# Patient Record
Sex: FEMALE | Race: WHITE
[De-identification: names, ages, dates, MRNs, and addresses within clinical notes are randomized per-mention and may not be internally consistent; named-entity substitution may affect disease eponyms.]

---

## 2017-02-18 ENCOUNTER — HOSPITAL ENCOUNTER (EMERGENCY)
Dept: HOSPITAL 84 - D.ER | Age: 57
Discharge: HOME | End: 2017-02-18
Payer: MEDICAID

## 2017-02-18 VITALS — BODY MASS INDEX: 20.8 KG/M2

## 2017-02-18 DIAGNOSIS — S00.01XA: Primary | ICD-10-CM

## 2017-02-18 DIAGNOSIS — C34.90: ICD-10-CM
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
## 2017-02-18 DIAGNOSIS — F17.200: ICD-10-CM
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
## 2017-08-04 ENCOUNTER — HOSPITAL ENCOUNTER (EMERGENCY)
Dept: HOSPITAL 84 - D.ER | Age: 57
Discharge: HOME | End: 2017-08-04
Payer: MEDICAID

## 2017-08-04 VITALS — BODY MASS INDEX: 20.8 KG/M2

## 2017-08-04 DIAGNOSIS — C34.90: ICD-10-CM

## 2017-08-04 DIAGNOSIS — Z76.0: ICD-10-CM

## 2017-08-04 DIAGNOSIS — J44.9: ICD-10-CM

## 2017-08-04 DIAGNOSIS — E16.2: Primary | ICD-10-CM

## 2017-08-04 DIAGNOSIS — J45.909: ICD-10-CM

## 2017-08-04 LAB
ALBUMIN SERPL-MCNC: 3.7 G/DL (ref 3.4–5)
ALP SERPL-CCNC: 77 U/L (ref 46–116)
ALT SERPL-CCNC: 77 U/L (ref 10–68)
ANION GAP SERPL CALC-SCNC: 16.3 MMOL/L (ref 8–16)
APPEARANCE UR: CLEAR
BASOPHILS NFR BLD AUTO: 0.4 % (ref 0–2)
BILIRUB SERPL-MCNC: 0.63 MG/DL (ref 0.2–1.3)
BILIRUB SERPL-MCNC: NEGATIVE MG/DL
BUN SERPL-MCNC: 9 MG/DL (ref 7–18)
CALCIUM SERPL-MCNC: 8.4 MG/DL (ref 8.5–10.1)
CHLORIDE SERPL-SCNC: 102 MMOL/L (ref 98–107)
CK MB SERPL-MCNC: 0.7 U/L (ref 0–3.6)
CK SERPL-CCNC: 104 UL (ref 21–215)
CO2 SERPL-SCNC: 23.5 MMOL/L (ref 21–32)
COLOR UR: YELLOW
CREAT SERPL-MCNC: 0.6 MG/DL (ref 0.6–1.3)
EOSINOPHIL NFR BLD: 6.7 % (ref 0–7)
ERYTHROCYTE [DISTWIDTH] IN BLOOD BY AUTOMATED COUNT: 13.4 % (ref 11.5–14.5)
GLOBULIN SER-MCNC: 3.1 G/L
GLUCOSE SERPL-MCNC: 135 MG/DL (ref 74–106)
GLUCOSE SERPL-MCNC: 50 MG/DL
HCT VFR BLD CALC: 39.4 % (ref 36–48)
HGB BLD-MCNC: 13.6 G/DL (ref 12–16)
IMM GRANULOCYTES NFR BLD: 0 % (ref 0–5)
KETONES UR STRIP-MCNC: (no result) MG/DL
LEUKOCYTE ESTERASE: NEGATIVE
LYMPHOCYTES NFR BLD AUTO: 30.1 % (ref 15–50)
MCH RBC QN AUTO: 33.7 PG (ref 26–34)
MCHC RBC AUTO-ENTMCNC: 34.5 G/DL (ref 31–37)
MCV RBC: 97.5 FL (ref 80–100)
MONOCYTES NFR BLD: 6.8 % (ref 2–11)
NEUTROPHILS NFR BLD AUTO: 56 % (ref 40–80)
NITRITE UR-MCNC: NEGATIVE MG/ML
OSMOLALITY SERPL CALC.SUM OF ELEC: 276 MOSM/KG (ref 275–300)
PH UR STRIP: 5 [PH] (ref 5–6)
PLATELET # BLD: 175 10X3/UL (ref 130–400)
PMV BLD AUTO: 9.4 FL (ref 7.4–10.4)
POTASSIUM SERPL-SCNC: 3.8 MMOL/L (ref 3.5–5.1)
PROT SERPL-MCNC: 6.8 G/DL (ref 6.4–8.2)
PROT UR-MCNC: NEGATIVE MG/DL
RBC # BLD AUTO: 4.04 10X6/UL (ref 4–5.4)
SODIUM SERPL-SCNC: 138 MMOL/L (ref 136–145)
SP GR UR STRIP: 1.01 (ref 1–1.02)
TROPONIN I SERPL-MCNC: < 0.017 NG/ML (ref 0–0.06)
UROBILINOGEN UR-MCNC: NORMAL MG/DL
WBC # BLD AUTO: 5.4 10X3/UL (ref 4.8–10.8)

## 2017-12-12 ENCOUNTER — HOSPITAL ENCOUNTER (EMERGENCY)
Dept: HOSPITAL 84 - D.ER | Age: 57
LOS: 1 days | Discharge: HOME | End: 2017-12-13
Payer: MEDICAID

## 2017-12-12 VITALS — BODY MASS INDEX: 20.8 KG/M2

## 2017-12-12 DIAGNOSIS — F17.200: ICD-10-CM

## 2017-12-12 DIAGNOSIS — R51: Primary | ICD-10-CM

## 2019-06-01 ENCOUNTER — HOSPITAL ENCOUNTER (OUTPATIENT)
Dept: HOSPITAL 84 - D.ER | Age: 59
Setting detail: OBSERVATION
LOS: 1 days | Discharge: LEFT BEFORE DISCHARGE | End: 2019-06-02
Admitting: FAMILY MEDICINE
Payer: MEDICAID
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
## 2019-06-01 DIAGNOSIS — R40.2244: ICD-10-CM

## 2019-06-01 DIAGNOSIS — F41.9: ICD-10-CM

## 2019-06-01 DIAGNOSIS — F10.129: Primary | ICD-10-CM

## 2019-06-01 DIAGNOSIS — I10: ICD-10-CM

## 2019-06-01 DIAGNOSIS — R40.2354: ICD-10-CM

## 2019-06-01 DIAGNOSIS — J44.9: ICD-10-CM

## 2019-06-01 DIAGNOSIS — R40.2144: ICD-10-CM

## 2019-06-01 DIAGNOSIS — M79.7: ICD-10-CM

## 2019-06-01 DIAGNOSIS — F32.9: ICD-10-CM

## 2021-05-04 ENCOUNTER — HOSPITAL ENCOUNTER (INPATIENT)
Dept: HOSPITAL 84 - D.ER | Age: 61
LOS: 3 days | Discharge: HOME | DRG: 439 | End: 2021-05-07
Attending: FAMILY MEDICINE | Admitting: FAMILY MEDICINE
Payer: MEDICAID

## 2021-05-04 VITALS — SYSTOLIC BLOOD PRESSURE: 160 MMHG | DIASTOLIC BLOOD PRESSURE: 99 MMHG

## 2021-05-04 VITALS — SYSTOLIC BLOOD PRESSURE: 127 MMHG | DIASTOLIC BLOOD PRESSURE: 76 MMHG

## 2021-05-04 VITALS — SYSTOLIC BLOOD PRESSURE: 158 MMHG | DIASTOLIC BLOOD PRESSURE: 93 MMHG

## 2021-05-04 VITALS — DIASTOLIC BLOOD PRESSURE: 82 MMHG | SYSTOLIC BLOOD PRESSURE: 149 MMHG

## 2021-05-04 VITALS — DIASTOLIC BLOOD PRESSURE: 95 MMHG | SYSTOLIC BLOOD PRESSURE: 157 MMHG

## 2021-05-04 VITALS — SYSTOLIC BLOOD PRESSURE: 151 MMHG | DIASTOLIC BLOOD PRESSURE: 90 MMHG

## 2021-05-04 VITALS — SYSTOLIC BLOOD PRESSURE: 161 MMHG | DIASTOLIC BLOOD PRESSURE: 95 MMHG

## 2021-05-04 VITALS — DIASTOLIC BLOOD PRESSURE: 86 MMHG | SYSTOLIC BLOOD PRESSURE: 135 MMHG

## 2021-05-04 VITALS — DIASTOLIC BLOOD PRESSURE: 98 MMHG | SYSTOLIC BLOOD PRESSURE: 163 MMHG

## 2021-05-04 VITALS
BODY MASS INDEX: 21.34 KG/M2 | WEIGHT: 125 LBS | WEIGHT: 125 LBS | BODY MASS INDEX: 21.34 KG/M2 | HEIGHT: 64 IN | BODY MASS INDEX: 21.34 KG/M2 | HEIGHT: 64 IN

## 2021-05-04 VITALS — SYSTOLIC BLOOD PRESSURE: 123 MMHG | DIASTOLIC BLOOD PRESSURE: 83 MMHG

## 2021-05-04 DIAGNOSIS — J44.9: ICD-10-CM

## 2021-05-04 DIAGNOSIS — N39.0: ICD-10-CM

## 2021-05-04 DIAGNOSIS — R74.01: ICD-10-CM

## 2021-05-04 DIAGNOSIS — R00.0: ICD-10-CM

## 2021-05-04 DIAGNOSIS — F17.203: ICD-10-CM

## 2021-05-04 DIAGNOSIS — E87.1: ICD-10-CM

## 2021-05-04 DIAGNOSIS — I25.10: ICD-10-CM

## 2021-05-04 DIAGNOSIS — K85.90: Primary | ICD-10-CM

## 2021-05-04 DIAGNOSIS — E87.6: ICD-10-CM

## 2021-05-04 DIAGNOSIS — I10: ICD-10-CM

## 2021-05-04 DIAGNOSIS — F10.20: ICD-10-CM

## 2021-05-04 DIAGNOSIS — E87.3: ICD-10-CM

## 2021-05-04 LAB
ALBUMIN SERPL-MCNC: 3.4 G/DL (ref 3.4–5)
ALP SERPL-CCNC: 166 U/L (ref 30–120)
ALT SERPL-CCNC: 99 U/L (ref 10–68)
AMPHETAMINES UR QL SCN: NEGATIVE QUAL
ANION GAP SERPL CALC-SCNC: 25.9 MMOL/L (ref 8–16)
ANION GAP SERPL CALC-SCNC: 30.6 MMOL/L (ref 8–16)
BACTERIA #/AREA URNS HPF: (no result) HPF
BARBITURATES UR QL SCN: NEGATIVE QUAL
BASOPHILS NFR BLD AUTO: 0.2 % (ref 0–2)
BENZODIAZ UR QL SCN: NEGATIVE QUAL
BILIRUB SERPL-MCNC: 1.17 MG/DL (ref 0.2–1.3)
BILIRUB SERPL-MCNC: NEGATIVE MG/DL
BUN SERPL-MCNC: 7 MG/DL (ref 7–18)
BUN SERPL-MCNC: 7 MG/DL (ref 7–18)
BZE UR QL SCN: NEGATIVE QUAL
CALCIUM SERPL-MCNC: 7.5 MG/DL (ref 8.5–10.1)
CALCIUM SERPL-MCNC: 8.9 MG/DL (ref 8.5–10.1)
CANNABINOIDS UR QL SCN: NEGATIVE QUAL
CHLORIDE SERPL-SCNC: 100 MMOL/L (ref 98–107)
CHLORIDE SERPL-SCNC: 93 MMOL/L (ref 98–107)
CHOLEST/HDLC SERPL: 2.5 RATIO (ref 2.3–4.1)
CK MB SERPL-MCNC: 1.4 U/L (ref 0–3.6)
CK MB SERPL-MCNC: 1.6 U/L (ref 0–3.6)
CK MB SERPL-MCNC: 1.7 U/L (ref 0–3.6)
CK SERPL-CCNC: 38 UL (ref 21–215)
CK SERPL-CCNC: 39 UL (ref 21–215)
CK SERPL-CCNC: 57 UL (ref 21–215)
CO2 SERPL-SCNC: 14.2 MMOL/L (ref 21–32)
CO2 SERPL-SCNC: 15.7 MMOL/L (ref 21–32)
CREAT SERPL-MCNC: 0.8 MG/DL (ref 0.6–1.3)
CREAT SERPL-MCNC: 1.1 MG/DL (ref 0.6–1.3)
EOSINOPHIL NFR BLD: 0 % (ref 0–7)
ERYTHROCYTE [DISTWIDTH] IN BLOOD BY AUTOMATED COUNT: 14.1 % (ref 11.5–14.5)
GLOBULIN SER-MCNC: 3.5 G/L
GLUCOSE SERPL-MCNC: 102 MG/DL (ref 74–106)
GLUCOSE SERPL-MCNC: 116 MG/DL (ref 74–106)
HCT VFR BLD CALC: 37.2 % (ref 36–48)
HDLC SERPL-MCNC: 63 MG/DL (ref 32–96)
HGB BLD-MCNC: 12.9 G/DL (ref 12–16)
IMM GRANULOCYTES NFR BLD: 0.3 % (ref 0–5)
KETONES UR STRIP-MCNC: (no result) MG/DL
LDL-HDL RATIO: 1.1 RATIO (ref 1.5–3.5)
LDLC SERPL-MCNC: 70 MG/DL (ref 0–100)
LIPASE SERPL-CCNC: 4823 U/L (ref 73–393)
LYMPHOCYTES # BLD: 0.92 10X3/UL (ref 1.18–3.74)
LYMPHOCYTES NFR BLD AUTO: 9.9 % (ref 15–50)
MAGNESIUM SERPL-MCNC: 1.3 MG/DL (ref 1.8–2.4)
MCH RBC QN AUTO: 35.4 PG (ref 26–34)
MCHC RBC AUTO-ENTMCNC: 34.7 G/DL (ref 31–37)
MCV RBC: 102.2 FL (ref 80–100)
MONOCYTES NFR BLD: 8.3 % (ref 2–11)
NEUTROPHILS # BLD: 7.55 10X3/UL (ref 1.56–6.13)
NEUTROPHILS NFR BLD AUTO: 81.3 % (ref 40–80)
NITRITE UR-MCNC: NEGATIVE MG/ML
OPIATES UR QL SCN: NEGATIVE QUAL
OSMOLALITY SERPL CALC.SUM OF ELEC: 266 MOSM/KG (ref 275–300)
OSMOLALITY SERPL CALC.SUM OF ELEC: 273 MOSM/KG (ref 275–300)
PCP UR QL SCN: NEGATIVE QUAL
PH UR STRIP: 5 [PH] (ref 5–8)
PLATELET # BLD: 207 10X3/UL (ref 130–400)
PMV BLD AUTO: 9.7 FL (ref 7.4–10.4)
POTASSIUM SERPL-SCNC: 3.6 MMOL/L (ref 3.5–5.1)
POTASSIUM SERPL-SCNC: 3.8 MMOL/L (ref 3.5–5.1)
PROT SERPL-MCNC: 6.9 G/DL (ref 6.4–8.2)
RBC # BLD AUTO: 3.64 10X6/UL (ref 4–5.4)
SODIUM SERPL-SCNC: 134 MMOL/L (ref 136–145)
SODIUM SERPL-SCNC: 138 MMOL/L (ref 136–145)
SQUAMOUS #/AREA URNS HPF: (no result) HPF (ref 0–4)
TRIGL SERPL-MCNC: 112 MG/DL (ref 30–200)
TROPONIN I SERPL-MCNC: 0.14 NG/ML (ref 0–0.06)
TROPONIN I SERPL-MCNC: 0.17 NG/ML (ref 0–0.06)
TROPONIN I SERPL-MCNC: 0.22 NG/ML (ref 0–0.06)
UROBILINOGEN UR-MCNC: NORMAL MG/DL (ref ?–2)
WBC # BLD AUTO: 9.3 10X3/UL (ref 4.8–10.8)
WBC #/AREA URNS HPF: (no result) HPF (ref 0–4)

## 2021-05-04 NOTE — NUR
CRITICAL RESULT CALLED TO ED. LACTIC ACID 2.6. EDP ETIENNE NOTIFIED, FURTHER
ORDERS ENTERED INTO CHART.

## 2021-05-04 NOTE — NUR
CODE SEPSIS CALLED TO ED ROOM 10. RAPID RESPONSE TREATMENT RECORD COMPLETED AND
ABX INITIATED AND DOCUMENTED IN MAR.

## 2021-05-04 NOTE — NUR
PATIENT TO UNTI VIA WHEELCHAIR. UP WITH STANDBY ASSIST FROM WHEELCHAIR TO BED.
IV IN LEFT AC SALINE LOCKED, IV IN RIGHT AC SALINE LOCKED. COMPLAINS OF
ABDOMINAL PAIN. DOCTOR AWARE. BED LOW POSITION, CALL LIGHT IN REACH. WILL
CONTINUE TO MONITOR.

## 2021-05-04 NOTE — NUR
ASSESSMENT PER FLOW SHEET, VS OBTAINED PER CNA, SALINE LOCK IN LEFT AND RIGHT
AC INTACT WITH NO REDNESS OR EDEMA, PT DENIES FLATUS, NO BM AND VOIDING WITH
SLIGHT BURNING SENSATION, PT INST ON AND ENC TO USE I.S., PT REFUSES SCD'S,
DENIES NEEDS OR PAIN AT THIS TIME, BED IN LOW POSITION, SIDE RAILS RAILS X 2,
CALL LIGHT IN REACH

## 2021-05-04 NOTE — NUR
PT AWAKE, WATCHING TV, FLUSHED SALINE LOCK, ADM 2200 MED SIVP PER MD ORDERS,
SEE EMAR, SALINE LOCK FLUSHED, PT REQUESTED AND SERVED ICE CHIPS, DENIES
FURTHER NEEDS OR PAIN

## 2021-05-05 VITALS — DIASTOLIC BLOOD PRESSURE: 74 MMHG | SYSTOLIC BLOOD PRESSURE: 132 MMHG

## 2021-05-05 VITALS — SYSTOLIC BLOOD PRESSURE: 125 MMHG | DIASTOLIC BLOOD PRESSURE: 92 MMHG

## 2021-05-05 VITALS — SYSTOLIC BLOOD PRESSURE: 132 MMHG | DIASTOLIC BLOOD PRESSURE: 82 MMHG

## 2021-05-05 VITALS — SYSTOLIC BLOOD PRESSURE: 144 MMHG | DIASTOLIC BLOOD PRESSURE: 87 MMHG

## 2021-05-05 VITALS — DIASTOLIC BLOOD PRESSURE: 95 MMHG | SYSTOLIC BLOOD PRESSURE: 140 MMHG

## 2021-05-05 LAB
ALBUMIN SERPL-MCNC: 2.6 G/DL (ref 3.4–5)
ALP SERPL-CCNC: 119 U/L (ref 30–120)
ALT SERPL-CCNC: 67 U/L (ref 10–68)
AMYLASE SERPL-CCNC: 124 U/L (ref 25–115)
ANION GAP SERPL CALC-SCNC: 18.5 MMOL/L (ref 8–16)
BASOPHILS NFR BLD AUTO: 0.1 % (ref 0–2)
BILIRUB SERPL-MCNC: 0.84 MG/DL (ref 0.2–1.3)
BUN SERPL-MCNC: 6 MG/DL (ref 7–18)
CALCIUM SERPL-MCNC: 7.8 MG/DL (ref 8.5–10.1)
CHLORIDE SERPL-SCNC: 99 MMOL/L (ref 98–107)
CO2 SERPL-SCNC: 21.5 MMOL/L (ref 21–32)
CREAT SERPL-MCNC: 0.5 MG/DL (ref 0.6–1.3)
EOSINOPHIL NFR BLD: 1 % (ref 0–7)
ERYTHROCYTE [DISTWIDTH] IN BLOOD BY AUTOMATED COUNT: 14.2 % (ref 11.5–14.5)
GLOBULIN SER-MCNC: 3 G/L
GLUCOSE SERPL-MCNC: 72 MG/DL (ref 74–106)
HCT VFR BLD CALC: 30.2 % (ref 36–48)
HCV AB S/CO SERPL IA: <0.1 S/CO RAT (ref 0–0.9)
HGB BLD-MCNC: 10 G/DL (ref 12–16)
IMM GRANULOCYTES NFR BLD: 0.1 % (ref 0–5)
LIPASE SERPL-CCNC: 907 U/L (ref 73–393)
LYMPHOCYTES # BLD: 1.25 10X3/UL (ref 1.18–3.74)
LYMPHOCYTES NFR BLD AUTO: 18.6 % (ref 15–50)
MAGNESIUM SERPL-MCNC: 1.9 MG/DL (ref 1.8–2.4)
MCH RBC QN AUTO: 34.2 PG (ref 26–34)
MCHC RBC AUTO-ENTMCNC: 33.1 G/DL (ref 31–37)
MCV RBC: 103.4 FL (ref 80–100)
MONOCYTES NFR BLD: 10.6 % (ref 2–11)
NEUTROPHILS # BLD: 4.66 10X3/UL (ref 1.56–6.13)
NEUTROPHILS NFR BLD AUTO: 69.6 % (ref 40–80)
OSMOLALITY SERPL CALC.SUM OF ELEC: 268 MOSM/KG (ref 275–300)
PLATELET # BLD: 162 10X3/UL (ref 130–400)
PMV BLD AUTO: 9.5 FL (ref 7.4–10.4)
POTASSIUM SERPL-SCNC: 3 MMOL/L (ref 3.5–5.1)
PROT SERPL-MCNC: 5.6 G/DL (ref 6.4–8.2)
RBC # BLD AUTO: 2.92 10X6/UL (ref 4–5.4)
SODIUM SERPL-SCNC: 136 MMOL/L (ref 136–145)
WBC # BLD AUTO: 6.7 10X3/UL (ref 4.8–10.8)

## 2021-05-05 NOTE — EC
PATIENT:ROSI JEFFRIES       DATE OF SERVICE: 05/04/21
SEX: F                                  MEDICAL RECORD: [id number]
DATE OF BIRTH: [birth year]                        LOCATION:[facility]
AGE OF PATIENT: 60                             ADMISSION DATE: 05/04/21
 
REFERRING PHYSICIAN:                               
 
INTERPRETING PHYSICIAN: GREGORY CANTRELL MD          
 
 
 
                             ECHOCARDIOGRAM REPORT
  ECHO CHARGES 4               ECHO COMPLETE                 Date: 05/04/21
 
 
 
CLINICAL DIAGNOSIS: LVH                           
 
                         ECHOCARDIOGRAPHIC MEASUREMENTS
      (adult normal given)
   AC root (d.<3.7cm) 2.4  cm   LV Septum d (<1.2 cm> 0.8  cm
      Valve Excursion 1.2  cm     LV Septum (systole) 1.2  cm
Left Atria (s.<4.0cm> 3.9  cm          LVPW d(<1.2cm) 1.1  cm
        RV (d.<2.3cm) 2.3  cm           LVPW (sytole) 1.3  cm
  LV diastole(<5.6CM) 4.8  cm       MV E-F(>70mm/sec)      cm
           LV systole 3.8  cm           LVOT Diameter 1.7  cm
       MV exc.(>10mm) 1.5  cm
Est.ejection fraction (50-75%)     %
 
   DOPPLER:
     LVIT      cm/sec A 92   cm/sec E 63    cm/sec
       LA      cm/sec      RVSP 30   mmHg
     LVOT 121  cm/sec   AOP1/2T      m/s
  Asc. Ao 156  cm/sec
     RVOT 63   cm/sec
       RA      cm/sec
       PA 83   cm/sec
 AV Gradient Peak 9.7  mmHg  AV Mean 5.8  mmHg  AV Area 1.7  cm
 MV Gradient Peak 4.8  mmHg  MV Mean 2.1  mmHg  MV Area      cm
   COMMENTS:                                              
 
 
 Cardiac Sonographer: Branden HEALY             
      Cardiologist: 3          Dr. Duenas             
             TAPE#                
                                       Pericardial Effusion N                        
 
 
DATE OF SERVICE:  
 
Adequate 2D, color flow imaging, spectral Doppler, and M-Mode.
 
No LVH.  LV internal dimensions are normal.  Wall motion is normal.  EF is
greater than or equal to 55%.  Aortic valve is tricuspid.  No evidence of
stenosis by Doppler interrogation.  There is mild AI by color flow imaging. 
Left atrium is normal at 3.9 cm.  Mitral valve shows no prolapse.  Trace MR. 
Right side is grossly normal.  Trace TR.
 
 
 
 
ECHOCARDIOGRAM REPORT                          [id number]    ROSI JEFFRIES
 
 
TRANSINT:[id number] Voice Confirmation ID: [id number] DOCUMENT ID: [id number]
                                           
                                           GREGORY CANTRELL MD          
 
 
 
Electronically Signed by GREGORY CANTRELL on 05/05/21 at 1338
 
 
 
 
 
 
 
 
 
 
 
 
 
 
 
 
 
 
 
 
 
 
 
 
 
 
 
 
 
 
 
 
 
 
 
 
 
 
 
CC:                                                             [id number]
DICTATION DATE: 05/04/21 1524     :     05/04/21 1601      ADM IN  
                                                                              
[facility]                                          
[address] [id number]

## 2021-05-05 NOTE — NUR
RESTING IN BED WITH EYES OPEN, ALERT AND ORIENTED. IV LOCATED TO LEFT AC
CURRENTLY RUNNING @ 125, SECOND IV TO RIGHT AC CURRENTLY SL. REFUSED TO WEAR
SCDS. ASSISTED TO BATHROOM. NO CURRENT S/S OF DISTRESS, DENIES FURTHER NEEDS,
WILL CONT TO MONITOR.

## 2021-05-05 NOTE — NUR
PT AWAKE, SALINE LOCKS FLUSHED, NS HUNG AT KVO, ROCEPHIN HUNG IVPB, BANANA BAG
HUNG, ADM PROTONIX WITH A SMALL SIP OF H20, LOPRESSOR ADM SIVP, PER MD ORDERS,
SEE EMAR, UA COLLECTED, PT DENIES NEEDS OR PAIN AT THIS TIME

## 2021-05-05 NOTE — NUR
PT AWAKE, GETTING BACK INTO BED, REPORTS VOIDED, DENIES NEEDS OR PAIN AT THIS
TIME, BED IN LOW POSITION, SIDE RAILS X 2, CALL LIGHT IN REACH

## 2021-05-06 VITALS — SYSTOLIC BLOOD PRESSURE: 150 MMHG | DIASTOLIC BLOOD PRESSURE: 99 MMHG

## 2021-05-06 VITALS — DIASTOLIC BLOOD PRESSURE: 75 MMHG | SYSTOLIC BLOOD PRESSURE: 144 MMHG

## 2021-05-06 VITALS — SYSTOLIC BLOOD PRESSURE: 129 MMHG | DIASTOLIC BLOOD PRESSURE: 84 MMHG

## 2021-05-06 VITALS — SYSTOLIC BLOOD PRESSURE: 153 MMHG | DIASTOLIC BLOOD PRESSURE: 90 MMHG

## 2021-05-06 VITALS — SYSTOLIC BLOOD PRESSURE: 121 MMHG | DIASTOLIC BLOOD PRESSURE: 85 MMHG

## 2021-05-06 LAB
ALBUMIN SERPL-MCNC: 2.3 G/DL (ref 3.4–5)
ALP SERPL-CCNC: 120 U/L (ref 30–120)
ALT SERPL-CCNC: 60 U/L (ref 10–68)
AMYLASE SERPL-CCNC: 114 U/L (ref 25–115)
ANION GAP SERPL CALC-SCNC: 13.3 MMOL/L (ref 8–16)
BASOPHILS NFR BLD AUTO: 0.2 % (ref 0–2)
BILIRUB SERPL-MCNC: 0.72 MG/DL (ref 0.2–1.3)
BUN SERPL-MCNC: 3 MG/DL (ref 7–18)
CALCIUM SERPL-MCNC: 7.8 MG/DL (ref 8.5–10.1)
CHLORIDE SERPL-SCNC: 100 MMOL/L (ref 98–107)
CO2 SERPL-SCNC: 24.6 MMOL/L (ref 21–32)
CREAT SERPL-MCNC: 0.4 MG/DL (ref 0.6–1.3)
EOSINOPHIL NFR BLD: 0.3 % (ref 0–7)
ERYTHROCYTE [DISTWIDTH] IN BLOOD BY AUTOMATED COUNT: 13.8 % (ref 11.5–14.5)
GLOBULIN SER-MCNC: 2.7 G/L
GLUCOSE SERPL-MCNC: 101 MG/DL (ref 74–106)
HCT VFR BLD CALC: 27.6 % (ref 36–48)
HGB BLD-MCNC: 9.3 G/DL (ref 12–16)
IMM GRANULOCYTES NFR BLD: 0.5 % (ref 0–5)
LIPASE SERPL-CCNC: 2032 U/L (ref 73–393)
LYMPHOCYTES # BLD: 1.33 10X3/UL (ref 1.18–3.74)
LYMPHOCYTES NFR BLD AUTO: 20.8 % (ref 15–50)
MAGNESIUM SERPL-MCNC: 1.7 MG/DL (ref 1.8–2.4)
MCH RBC QN AUTO: 34.6 PG (ref 26–34)
MCHC RBC AUTO-ENTMCNC: 33.7 G/DL (ref 31–37)
MCV RBC: 102.6 FL (ref 80–100)
MONOCYTES NFR BLD: 15.8 % (ref 2–11)
NEUTROPHILS # BLD: 4 10X3/UL (ref 1.56–6.13)
NEUTROPHILS NFR BLD AUTO: 62.4 % (ref 40–80)
OSMOLALITY SERPL CALC.SUM OF ELEC: 266 MOSM/KG (ref 275–300)
PLATELET # BLD: 168 10X3/UL (ref 130–400)
PMV BLD AUTO: 9.6 FL (ref 7.4–10.4)
POTASSIUM SERPL-SCNC: 2.9 MMOL/L (ref 3.5–5.1)
PROT SERPL-MCNC: 5 G/DL (ref 6.4–8.2)
RBC # BLD AUTO: 2.69 10X6/UL (ref 4–5.4)
SODIUM SERPL-SCNC: 135 MMOL/L (ref 136–145)
WBC # BLD AUTO: 6.4 10X3/UL (ref 4.8–10.8)

## 2021-05-06 NOTE — NUR
ALERT AND OREITNED X4.ELECTROLYTES REPLACED PER PROTOCOL. TELEMERY INTACT
SINUS TACH 108. DENIES ANY PAIN OR DISCOMFORT. ENCOURAGED TO USE CALL LIGHT
FOR ASSSIT.

## 2021-05-07 VITALS — DIASTOLIC BLOOD PRESSURE: 83 MMHG | SYSTOLIC BLOOD PRESSURE: 135 MMHG

## 2021-05-07 VITALS — SYSTOLIC BLOOD PRESSURE: 141 MMHG | DIASTOLIC BLOOD PRESSURE: 89 MMHG

## 2021-05-07 VITALS — SYSTOLIC BLOOD PRESSURE: 133 MMHG | DIASTOLIC BLOOD PRESSURE: 84 MMHG

## 2021-05-07 LAB
ALBUMIN SERPL-MCNC: 2.3 G/DL (ref 3.4–5)
ALP SERPL-CCNC: 121 U/L (ref 30–120)
ALT SERPL-CCNC: 51 U/L (ref 10–68)
AMYLASE SERPL-CCNC: 154 U/L (ref 25–115)
ANION GAP SERPL CALC-SCNC: 11.6 MMOL/L (ref 8–16)
BASOPHILS NFR BLD AUTO: 0.2 % (ref 0–2)
BILIRUB SERPL-MCNC: 0.75 MG/DL (ref 0.2–1.3)
BUN SERPL-MCNC: 3 MG/DL (ref 7–18)
CALCIUM SERPL-MCNC: 8 MG/DL (ref 8.5–10.1)
CHLORIDE SERPL-SCNC: 102 MMOL/L (ref 98–107)
CO2 SERPL-SCNC: 23.7 MMOL/L (ref 21–32)
CREAT SERPL-MCNC: 0.3 MG/DL (ref 0.6–1.3)
EOSINOPHIL NFR BLD: 0.2 % (ref 0–7)
ERYTHROCYTE [DISTWIDTH] IN BLOOD BY AUTOMATED COUNT: 14.1 % (ref 11.5–14.5)
GLOBULIN SER-MCNC: 2.8 G/L
GLUCOSE SERPL-MCNC: 92 MG/DL (ref 74–106)
HCT VFR BLD CALC: 27.7 % (ref 36–48)
HGB BLD-MCNC: 9.3 G/DL (ref 12–16)
IMM GRANULOCYTES NFR BLD: 0.6 % (ref 0–5)
LIPASE SERPL-CCNC: 2449 U/L (ref 73–393)
LYMPHOCYTES # BLD: 1.51 10X3/UL (ref 1.18–3.74)
LYMPHOCYTES NFR BLD AUTO: 24.2 % (ref 15–50)
MAGNESIUM SERPL-MCNC: 2 MG/DL (ref 1.8–2.4)
MCH RBC QN AUTO: 34.6 PG (ref 26–34)
MCHC RBC AUTO-ENTMCNC: 33.6 G/DL (ref 31–37)
MCV RBC: 103 FL (ref 80–100)
MONOCYTES NFR BLD: 19.4 % (ref 2–11)
NEUTROPHILS # BLD: 3.46 10X3/UL (ref 1.56–6.13)
NEUTROPHILS NFR BLD AUTO: 55.4 % (ref 40–80)
OSMOLALITY SERPL CALC.SUM OF ELEC: 264 MOSM/KG (ref 275–300)
PLATELET # BLD: 163 10X3/UL (ref 130–400)
PMV BLD AUTO: 9.8 FL (ref 7.4–10.4)
POTASSIUM SERPL-SCNC: 3.3 MMOL/L (ref 3.5–5.1)
PROT SERPL-MCNC: 5.1 G/DL (ref 6.4–8.2)
RBC # BLD AUTO: 2.69 10X6/UL (ref 4–5.4)
SODIUM SERPL-SCNC: 134 MMOL/L (ref 136–145)
WBC # BLD AUTO: 6.2 10X3/UL (ref 4.8–10.8)

## 2021-05-07 NOTE — NUR
IV AND TELEMETRY DISCONTINUED AND VERBALIZED UNDERSTANDING OF DISCHARGE
INSTRUCTIONS. STABLE AT TIME OF DISCHARGE.

## 2021-05-07 NOTE — MORECARE
CASE MANAGEMENT DISCHARGE SUMMARY
 
 
PATIENT: ROSI JEFFRIES           UNIT: [id number]
ACCOUNT#: [id number]                       ADM DATE: 21
AGE: 60     : 60  SEX: F            ROOM/BED: D.2215    
AUTHOR: CHRISS HERNANDEZ                             PHYSICIAN:                               
 
REFERRING PHYSICIAN: CHACHO SANDOVAL MD          
DATE OF SERVICE: 21
Case Management Discharge Planning Summary
 
 
COMMENTS 
ENTERED DATE:  21  14:22 CT
COMMENT TYPE:  Discharge Planning
REVIEWER: Shefali Haynes
Patient's P[facility] is [facility]
__________________________________________________
ENTERED DATE:  21  14:18 CT
COMMENT TYPE:  Discharge Planning
REVIEWER: Shefali Haynes
CM met with patient to complete initial dc planning assessment.  CM educated 
patient on the CM role and verbal consent given by patient to complete 
assessment.   Patient lives at  home with a friend.  Her friend Payam is 
with her in her room and they will take a taxi home.    At discharge patient 
plans to return  home and feels this is a safe discharge.  CM discussed 
availability of home health, rehab services, and medical equipment. Patient 
denied known discharge needs at this time. CM will continue to follow and 
will assist as needed with dc plans/needs.
 
 
DCP REVIEW SUMMARY
ANTICIPATED D/C DATE: 
EXPECTED LOS : 
CASE STATUS:  DCP Initiated
INITIAL REVIEW:  2021
INITIAL REVIEWER:   Shefali Haynes
FINAL DISCHARGE DISPOSITION: 01 : Home or Self Care (Routine Discharge)
FINAL REVIEWER:  
FINAL REVIEW DATE: 
 
  
 
DCP Focus Questions & Answers
 
 
QUESTION: ANSWER
 : 
 
 
 
 
 
 
PATIENT:  ROSI JEFFRIES
ENCOUNTER:  [id number]
MEDICAL RECORD#:  [id number]
ADMISSION DATE:  2021
DISCHARGE DATE:  
ATTENDING MD:  JAIME SANDOVAL
:   1960-Aug-05
AGE:  60
MARITAL STATUS:   S
DC PLAN ID:  [id number]
FACILITY:   [facility]
PRINTED ON: 21  14:25  CT
 
 
 
 
 
 
 
 
**All edits/amendments must be made on the electronic document**
 
DICTATION DATE: 21 5105     : JAQUELINE  21 3661     
RPT#: [id number]                                DC DATE:        
                                               STATUS: ADM IN  
[facility]
 [place] [address]
***END OF REPORT***

## 2021-05-10 NOTE — MORECARE
CASE MANAGEMENT DISCHARGE SUMMARY
 
 
PATIENT: ROSI JEFFRIES           UNIT: [id number]
ACCOUNT#: [id number]                       ADM DATE: 21
AGE: 60     : 60  SEX: F            ROOM/BED: D.[facility]5    
AUTHOR: CHRISS HERNANDEZ                             PHYSICIAN:                               
 
REFERRING PHYSICIAN: CHACHO SANDOVAL MD          
DATE OF SERVICE: 05/10/21
Case Management Discharge Planning Summary
 
 
COMMENTS 
ENTERED DATE:  21  14:22 CT
COMMENT TYPE:  Discharge Planning
REVIEWER: Shefali Haynes
Patient's P[facility] is [facility]
__________________________________________________
ENTERED DATE:  21  14:18 CT
COMMENT TYPE:  Discharge Planning
REVIEWER: Shefali Haynes
CM met with patient to complete initial dc planning assessment.  CM educated 
patient on the CM role and verbal consent given by patient to complete 
assessment.   Patient lives at  home with a friend.  Her friend Payam is 
with her in her room and they will take a taxi home.    At discharge patient 
plans to return  home and feels this is a safe discharge.  CM discussed 
availability of home health, rehab services, and medical equipment. Patient 
denied known discharge needs at this time. CM will continue to follow and 
will assist as needed with dc plans/needs.
 
 
DCP REVIEW SUMMARY
ANTICIPATED D/C DATE: 
EXPECTED LOS : 
CASE STATUS:  DCP Initiated
INITIAL REVIEW:  2021
INITIAL REVIEWER:   Shefali Haynes
FINAL DISCHARGE DISPOSITION: 01 : Home or Self Care (Routine Discharge)
FINAL REVIEWER:  
FINAL REVIEW DATE: 
 
  
 
DCP Focus Questions & Answers
 
 
QUESTION: ANSWER
 : 
 
 
 
 
 
 
PATIENT:  ROSI JEFFRIES
ENCOUNTER:  [id number]
MEDICAL RECORD#:  [id number]
ADMISSION DATE:  2021
DISCHARGE DATE:  2021
ATTENDING MD:  JAIME SANDOVAL
:   1960-Aug-05
AGE:  60
MARITAL STATUS:   S
DC PLAN ID:  [id number]
FACILITY:   [facility]
PRINTED ON: 5/10/21  14:54  CT
 
 
 
 
 
 
 
 
**All edits/amendments must be made on the electronic document**
 
DICTATION DATE: 05/10/21 6785     : JAQUELINE  05/10/21 1454     
RPT#: [id number]                                DC DATE:21
                                               STATUS: DIS IN  
[facility]
[address] [place] [address]
***END OF REPORT***

## 2021-06-08 ENCOUNTER — HOSPITAL ENCOUNTER (OUTPATIENT)
Dept: HOSPITAL 84 - D.US | Age: 61
Discharge: HOME | End: 2021-06-08
Attending: INTERNAL MEDICINE
Payer: MEDICAID

## 2021-06-08 VITALS — BODY MASS INDEX: 21.4 KG/M2

## 2021-06-08 DIAGNOSIS — R60.0: Primary | ICD-10-CM
